# Patient Record
Sex: FEMALE | Race: BLACK OR AFRICAN AMERICAN | NOT HISPANIC OR LATINO | Employment: FULL TIME | URBAN - METROPOLITAN AREA
[De-identification: names, ages, dates, MRNs, and addresses within clinical notes are randomized per-mention and may not be internally consistent; named-entity substitution may affect disease eponyms.]

---

## 2023-10-06 PROBLEM — N94.6 DYSMENORRHEA: Status: ACTIVE | Noted: 2023-10-06

## 2023-10-06 PROBLEM — H53.8 BLURRED VISION: Status: ACTIVE | Noted: 2023-10-06

## 2023-10-06 PROBLEM — L70.0 ACNE VULGARIS: Status: ACTIVE | Noted: 2022-12-05

## 2023-10-06 PROBLEM — B37.31 CANDIDA VAGINITIS: Status: ACTIVE | Noted: 2023-10-06

## 2023-10-06 PROBLEM — E55.9 VITAMIN D DEFICIENCY: Status: ACTIVE | Noted: 2023-10-06

## 2023-10-06 PROBLEM — R07.9 CHEST PAIN: Status: ACTIVE | Noted: 2023-10-06

## 2023-10-06 PROBLEM — L01.00 IMPETIGO: Status: ACTIVE | Noted: 2023-10-06

## 2023-10-06 PROBLEM — B86 SCABIES: Status: ACTIVE | Noted: 2023-10-06

## 2023-10-06 PROBLEM — L50.9 URTICARIA: Status: ACTIVE | Noted: 2023-10-06

## 2023-10-06 PROBLEM — N89.8 VAGINAL DISCHARGE: Status: ACTIVE | Noted: 2023-10-06

## 2023-10-06 PROBLEM — N76.0 BACTERIAL VAGINOSIS: Status: ACTIVE | Noted: 2023-10-06

## 2023-10-06 PROBLEM — B96.89 BACTERIAL VAGINOSIS: Status: ACTIVE | Noted: 2023-10-06

## 2023-10-06 PROBLEM — R41.840 ATTENTION OR CONCENTRATION DEFICIT: Status: ACTIVE | Noted: 2023-10-06

## 2023-10-06 PROBLEM — A74.9 CHLAMYDIA: Status: ACTIVE | Noted: 2023-10-06

## 2023-10-06 PROBLEM — H66.90 RECURRENT OTITIS MEDIA: Status: ACTIVE | Noted: 2023-10-06

## 2023-10-06 PROBLEM — L57.9 SKIN CHANGES DUE TO CHRONIC EXPOSURE TO NONIONIZING RADIATION, UNSPECIFIED: Status: ACTIVE | Noted: 2022-12-05

## 2023-10-06 PROBLEM — E87.8 IMPAIRED HYDRATION: Status: ACTIVE | Noted: 2023-10-06

## 2023-10-06 RX ORDER — DESOGESTREL AND ETHINYL ESTRADIOL 0.15-0.03
1 KIT ORAL DAILY
COMMUNITY
Start: 2021-06-24

## 2023-10-06 RX ORDER — L. ACIDOPHILUS/L.BULGARICUS 1MM CELL
1 TABLET ORAL DAILY
COMMUNITY
Start: 2020-05-26

## 2023-10-06 RX ORDER — METRONIDAZOLE 500 MG/1
TABLET ORAL
COMMUNITY
Start: 2017-09-28

## 2023-10-06 RX ORDER — IBUPROFEN 200 MG
1 CAPSULE ORAL DAILY
COMMUNITY
Start: 2020-04-16

## 2023-10-06 RX ORDER — BENZOYL PEROXIDE 100 MG/ML
LIQUID TOPICAL
COMMUNITY
Start: 2019-09-17

## 2023-10-06 RX ORDER — CLINDAMYCIN PHOSPHATE 10 UG/ML
LOTION TOPICAL
COMMUNITY
Start: 2019-09-17

## 2024-12-09 ENCOUNTER — HOSPITAL ENCOUNTER (EMERGENCY)
Facility: HOSPITAL | Age: 24
Discharge: HOME | End: 2024-12-09
Payer: COMMERCIAL

## 2024-12-09 VITALS
HEART RATE: 96 BPM | RESPIRATION RATE: 18 BRPM | DIASTOLIC BLOOD PRESSURE: 99 MMHG | TEMPERATURE: 97.8 F | HEIGHT: 67 IN | WEIGHT: 160 LBS | OXYGEN SATURATION: 99 % | BODY MASS INDEX: 25.11 KG/M2 | SYSTOLIC BLOOD PRESSURE: 139 MMHG

## 2024-12-09 DIAGNOSIS — Z11.3 SCREEN FOR STD (SEXUALLY TRANSMITTED DISEASE): Primary | ICD-10-CM

## 2024-12-09 DIAGNOSIS — R03.0 ELEVATED BLOOD PRESSURE READING: ICD-10-CM

## 2024-12-09 DIAGNOSIS — Z53.21 ELOPED FROM EMERGENCY DEPARTMENT: ICD-10-CM

## 2024-12-09 DIAGNOSIS — R82.998 URINE WHITE BLOOD CELLS INCREASED: ICD-10-CM

## 2024-12-09 LAB
APPEARANCE UR: ABNORMAL
BACTERIA #/AREA URNS AUTO: ABNORMAL /HPF
BILIRUB UR STRIP.AUTO-MCNC: NEGATIVE MG/DL
CLUE CELLS SPEC QL WET PREP: NORMAL
COLOR UR: YELLOW
GLUCOSE UR STRIP.AUTO-MCNC: NORMAL MG/DL
HCG UR QL IA.RAPID: NEGATIVE
KETONES UR STRIP.AUTO-MCNC: ABNORMAL MG/DL
LEUKOCYTE ESTERASE UR QL STRIP.AUTO: ABNORMAL
MUCOUS THREADS #/AREA URNS AUTO: ABNORMAL /LPF
NITRITE UR QL STRIP.AUTO: NEGATIVE
PH UR STRIP.AUTO: 6 [PH]
PROT UR STRIP.AUTO-MCNC: ABNORMAL MG/DL
RBC # UR STRIP.AUTO: NEGATIVE /UL
RBC #/AREA URNS AUTO: ABNORMAL /HPF
SP GR UR STRIP.AUTO: 1.03
SQUAMOUS #/AREA URNS AUTO: ABNORMAL /HPF
T VAGINALIS SPEC QL WET PREP: NORMAL
UROBILINOGEN UR STRIP.AUTO-MCNC: ABNORMAL MG/DL
WBC #/AREA URNS AUTO: ABNORMAL /HPF
WBC VAG QL WET PREP: NORMAL
YEAST VAG QL WET PREP: NORMAL

## 2024-12-09 PROCEDURE — 81025 URINE PREGNANCY TEST: CPT

## 2024-12-09 PROCEDURE — 87591 N.GONORRHOEAE DNA AMP PROB: CPT | Mod: AHULAB

## 2024-12-09 PROCEDURE — 99283 EMERGENCY DEPT VISIT LOW MDM: CPT

## 2024-12-09 PROCEDURE — 81001 URINALYSIS AUTO W/SCOPE: CPT

## 2024-12-09 PROCEDURE — 87086 URINE CULTURE/COLONY COUNT: CPT | Mod: AHULAB

## 2024-12-09 PROCEDURE — 87210 SMEAR WET MOUNT SALINE/INK: CPT

## 2024-12-09 RX ORDER — CEPHALEXIN 500 MG/1
500 CAPSULE ORAL 2 TIMES DAILY
Qty: 10 CAPSULE | Refills: 0 | Status: SHIPPED | OUTPATIENT
Start: 2024-12-09 | End: 2024-12-14

## 2024-12-09 RX ORDER — CEPHALEXIN 500 MG/1
500 CAPSULE ORAL ONCE
Status: DISCONTINUED | OUTPATIENT
Start: 2024-12-09 | End: 2024-12-09 | Stop reason: HOSPADM

## 2024-12-09 RX ORDER — CEPHALEXIN 500 MG/1
500 CAPSULE ORAL 2 TIMES DAILY
Qty: 10 CAPSULE | Refills: 0 | Status: SHIPPED | OUTPATIENT
Start: 2024-12-09 | End: 2024-12-09

## 2024-12-09 ASSESSMENT — PAIN SCALES - GENERAL
PAINLEVEL_OUTOF10: 0 - NO PAIN
PAINLEVEL_OUTOF10: 0 - NO PAIN

## 2024-12-09 ASSESSMENT — COLUMBIA-SUICIDE SEVERITY RATING SCALE - C-SSRS
6. HAVE YOU EVER DONE ANYTHING, STARTED TO DO ANYTHING, OR PREPARED TO DO ANYTHING TO END YOUR LIFE?: NO
2. HAVE YOU ACTUALLY HAD ANY THOUGHTS OF KILLING YOURSELF?: NO
1. IN THE PAST MONTH, HAVE YOU WISHED YOU WERE DEAD OR WISHED YOU COULD GO TO SLEEP AND NOT WAKE UP?: NO

## 2024-12-09 ASSESSMENT — PAIN - FUNCTIONAL ASSESSMENT: PAIN_FUNCTIONAL_ASSESSMENT: 0-10

## 2024-12-09 NOTE — ED TRIAGE NOTES
Pt to ED from home requesting to have STD check.  Pt denies any symptoms, and denies her partner having any STDS or symptoms.  Pt nt answering when asked why she wants a check.

## 2024-12-10 LAB
C TRACH RRNA SPEC QL NAA+PROBE: NEGATIVE
N GONORRHOEA DNA SPEC QL PROBE+SIG AMP: NEGATIVE

## 2024-12-10 NOTE — ED PROVIDER NOTES
HPI   Chief Complaint   Patient presents with    Exposure to STD       24-year-old female presents to the ED today with a chief concern of exposure to STD.  Patient reports that she has no symptoms with just once get checked.  She reports that her boyfriend told her that he was going to get tested.  Patient denies any symptoms.  She denies any vaginal discharge.  She denies any fever/chills, nausea/vomiting.  Denies urinary symptoms.  Has had an STD in the past does not like she has 1 now.  Has no other symptoms or concerns at this time.      History provided by:  Patient   used: No            Patient History   Past Medical History:   Diagnosis Date    Other specified health status     No pertinent past surgical history    Other specified health status     No pertinent past medical history    Personal history of other (healed) physical injury and trauma 09/28/2017    History of insect bite    Personal history of other diseases of the nervous system and sense organs 12/19/2014    History of impacted cerumen    Personal history of other infectious and parasitic diseases 07/29/2014    History of tinea corporis     No past surgical history on file.  No family history on file.  Social History     Tobacco Use    Smoking status: Not on file    Smokeless tobacco: Not on file   Substance Use Topics    Alcohol use: Not on file    Drug use: Not on file       Physical Exam   ED Triage Vitals [12/09/24 1810]   Temperature Heart Rate Respirations BP   36.6 °C (97.8 °F) 96 18 (!) 139/99      Pulse Ox Temp Source Heart Rate Source Patient Position   99 % Temporal Monitor Sitting      BP Location FiO2 (%)     Right arm --       Physical Exam  Constitutional: Vital signs per nursing notes.  Well developed, well nourished.  No acute distress.    Eyes:  conjunctivae and lids normal  ENT: Ears normal externally; face symmetric. voice normal  Neck: neck supple, no meningismus; trachea midline without  deviation  Respiratory: normal respiratory effort and excursion; no rales, rhonchi, or wheezes; equal air entry  Cardiovascular: RRR, 2+ pulses extremities   Neurological: normal speech; CN II-XII grossly intact, normal motor and sensory function  GI: no distention, soft, nontender  : Deferred.  Denies  exam.  Prefers to self swab.  Musculoskeletal: normal gait and station; normal digits and nails; normal to palpation; normal strength/tone; neurovascular status intact.  Skin: normal to inspection; normal to palpation; no rash      ED Course & MDM   ED Course as of 12/11/24 1317   Mon Dec 09, 2024   1933 Urine is contaminated. [MC]   1933 Urine hCG negative [MC]   2040 Wet prep negative  [MC]      ED Course User Index  [MC] Cosme Culver PA-C         Diagnoses as of 12/11/24 1317   Screen for STD (sexually transmitted disease)   Elevated blood pressure reading   Urine white blood cells increased   Eloped from emergency department                 No data recorded     [place] Coma Scale Score: 15 (12/09/24 1809 : James Beckford RN)                           Medical Decision Making  24-year-old female presents to the ED today with a chief concern of exposure to STD.  Vital signs reassuring.  Patient overall appears well and is nontoxic-appearing. Patient has full range of motion of the neck without any meningismus.  Satting well on room air.  Not hypoxic.  Not tachycardic.  Afebrile.  Urinalysis shows contamination.  No nitrite but there is positive leukocyte esterase.  Urine hCG negative.  Wet prep normal.  Patient was also tested for gonorrhea and chlamydia.  She was not treated in advance for these.  Discussed with patient that she will be called by post discharge nurse if these results are positive.  Patient left without treatment being complete.  Called the patient and she told that she did not know that she was actually supposed to stay for the results.  She is refusing to come back.  She states that she will  just follow her results on MyChart.  Unable to provide full history, physical exam, and MDM due to the fact that patient left the treatment pain complete.  Did send Keflex for the white blood cells in the urine.  Will send urine for culture.    Differential diagnosis: STD, BV, candidiasis, uti    Disposition/treatment  1.  See diagnosis    Patient left without treatment being complete        Procedure  Procedures     Cosme Culver PA-C  12/11/24 [id number]

## 2024-12-11 LAB — BACTERIA UR CULT: NORMAL

## 2025-01-04 ENCOUNTER — OFFICE VISIT (OUTPATIENT)
Dept: URGENT CARE | Age: 25
End: 2025-01-04
Payer: COMMERCIAL

## 2025-01-04 VITALS
DIASTOLIC BLOOD PRESSURE: 84 MMHG | OXYGEN SATURATION: 98 % | TEMPERATURE: 98.9 F | HEART RATE: 103 BPM | SYSTOLIC BLOOD PRESSURE: 125 MMHG

## 2025-01-04 DIAGNOSIS — J02.9 PHARYNGITIS, UNSPECIFIED ETIOLOGY: ICD-10-CM

## 2025-01-04 DIAGNOSIS — J02.0 STREP PHARYNGITIS: Primary | ICD-10-CM

## 2025-01-04 LAB — POC RAPID STREP: POSITIVE

## 2025-01-04 RX ORDER — GLYCERIN 0.25 %
1 DROPS OPHTHALMIC (EYE) 4 TIMES DAILY PRN
Qty: 60 ML | Refills: 0 | Status: SHIPPED | OUTPATIENT
Start: 2025-01-04 | End: 2025-02-03

## 2025-01-04 RX ORDER — AMOXICILLIN 875 MG/1
875 TABLET, FILM COATED ORAL 2 TIMES DAILY
Qty: 20 TABLET | Refills: 0 | Status: SHIPPED | OUTPATIENT
Start: 2025-01-04 | End: 2025-01-14

## 2025-01-04 ASSESSMENT — ENCOUNTER SYMPTOMS
NAUSEA: 0
SORE THROAT: 1
RESPIRATORY NEGATIVE: 1
CONSTITUTIONAL NEGATIVE: 1
VOMITING: 0

## 2025-01-04 NOTE — PROGRESS NOTES
[facility]   Patient ID: Anahi Meek is a 24 y.o. female. They present today with a chief complaint of swollen tonsils (Pain and swollen sore throat, hard to swallow xyesterday/HX Tonsilitis).    History of Present Illness  Sore throat for a couple of days.  Denies any other symptoms.  Pain is extremely severe      History provided by:  Patient   used: No        Past Medical History  Allergies as of 01/04/2025    (No Known Allergies)       (Not in a hospital admission)       Past Medical History:   Diagnosis Date    Other specified health status     No pertinent past surgical history    Other specified health status     No pertinent past medical history    Personal history of other (healed) physical injury and trauma 09/28/2017    History of insect bite    Personal history of other diseases of the nervous system and sense organs 12/19/2014    History of impacted cerumen    Personal history of other infectious and parasitic diseases 07/29/2014    History of tinea corporis       No past surgical history on file.         Review of Systems  Review of Systems   Constitutional: Negative.    HENT:  Positive for sore throat.         Complains of a sore throat which is worse when she swallows   Respiratory: Negative.     Gastrointestinal:  Negative for nausea and vomiting.                                  Objective    Vitals:    01/04/25 1707   BP: 125/84   BP Location: Left arm   Patient Position: Sitting   BP Cuff Size: Small adult   Pulse: 103   Temp: 37.2 °C (98.9 °F)   TempSrc: Oral   SpO2: 98%     No LMP recorded.    Physical Exam  Vitals and nursing note reviewed.   Constitutional:       Appearance: Normal appearance.      Comments: Pleasant cooperative very well coiffed well-dressed fully made up 24-year-old female here with her significant other.   HENT:      Head: Normocephalic and atraumatic.      Right Ear: Ear canal and external ear normal.      Left Ear: Ear canal and external ear  normal.      Nose: Nose normal.      Mouth/Throat:      Mouth: Mucous membranes are moist.      Comments: Tonsils 1+ bilaterally.  No exudates.  Uvula midline.  Throat is beefy red with petechiae over the soft palate  Eyes:      Extraocular Movements: Extraocular movements intact.      Pupils: Pupils are equal, round, and reactive to light.   Cardiovascular:      Rate and Rhythm: Normal rate.   Pulmonary:      Effort: Pulmonary effort is normal. No respiratory distress.   Neurological:      General: No focal deficit present.      Mental Status: She is alert and oriented to person, place, and time.   Psychiatric:         Mood and Affect: Mood normal.         Behavior: Behavior normal.         Procedures    Point of Care Test & Imaging Results from this visit  No results found for this visit on 01/04/25.   No results found.    Diagnostic study results (if any) were reviewed by [facility].    Assessment/Plan   Allergies, medications, history, and pertinent labs/EKGs/Imaging reviewed by Christy Aiken PA-C.     Medical Decision Making  History and physical examination as well as testing are all consistent with strep pharyngitis.  Will treat her with amoxicillin.  Will give her some Solu-Medrol IM here to help with the swelling and inflammation which will reduce her throat pain.  Will also prescribe Chloraseptic throat spray.  If her symptoms worsen especially if she develops difficulty swallowing changes in her voice or drooling she will go to the ER for follow-up    Orders and Diagnoses  There are no diagnoses linked to this encounter.    Medical Admin Record      Patient disposition: Home    Electronically signed by [facility]  5:56 PM

## 2025-06-03 ENCOUNTER — APPOINTMENT (OUTPATIENT)
Dept: DERMATOLOGY | Facility: CLINIC | Age: 25
End: 2025-06-03
Payer: COMMERCIAL

## 2025-06-03 DIAGNOSIS — L70.0 ACNE VULGARIS: Primary | ICD-10-CM

## 2025-06-03 PROCEDURE — 99203 OFFICE O/P NEW LOW 30 MIN: CPT

## 2025-06-03 RX ORDER — CLINDAMYCIN PHOSPHATE 10 UG/ML
LOTION TOPICAL DAILY
Qty: 60 ML | Refills: 3 | Status: SHIPPED | OUTPATIENT
Start: 2025-06-03 | End: 2026-06-03

## 2025-06-03 RX ORDER — TRETINOIN 0.25 MG/G
CREAM TOPICAL
Qty: 45 G | Refills: 3 | Status: SHIPPED | OUTPATIENT
Start: 2025-06-03

## 2025-06-03 RX ORDER — BENZOYL PEROXIDE 50 MG/ML
1 LIQUID TOPICAL DAILY
Qty: 236 ML | Refills: 3 | Status: SHIPPED | OUTPATIENT
Start: 2025-06-03 | End: 2026-06-03

## 2025-06-03 ASSESSMENT — DERMATOLOGY QUALITY OF LIFE (QOL) ASSESSMENT
RATE HOW EMOTIONALLY BOTHERED YOU ARE BY YOUR SKIN PROBLEM (FOR EXAMPLE, WORRY, EMBARRASSMENT, FRUSTRATION): 6 - ALWAYS BOTHERED
RATE HOW BOTHERED YOU ARE BY SYMPTOMS OF YOUR SKIN PROBLEM (EG, ITCHING, STINGING BURNING, HURTING OR SKIN IRRITATION): 3
RATE HOW EMOTIONALLY BOTHERED YOU ARE BY YOUR SKIN PROBLEM (FOR EXAMPLE, WORRY, EMBARRASSMENT, FRUSTRATION): 6 - ALWAYS BOTHERED
RATE HOW BOTHERED YOU ARE BY EFFECTS OF YOUR SKIN PROBLEMS ON YOUR ACTIVITIES (EG, GOING OUT, ACCOMPLISHING WHAT YOU WANT, WORK ACTIVITIES OR YOUR RELATIONSHIPS WITH OTHERS): 0 - NEVER BOTHERED
WHAT SINGLE SKIN CONDITION LISTED BELOW IS THE PATIENT ANSWERING THE QUALITY-OF-LIFE ASSESSMENT QUESTIONS ABOUT: ACNE
WHAT SINGLE SKIN CONDITION LISTED BELOW IS THE PATIENT ANSWERING THE QUALITY-OF-LIFE ASSESSMENT QUESTIONS ABOUT: ACNE
RATE HOW BOTHERED YOU ARE BY EFFECTS OF YOUR SKIN PROBLEMS ON YOUR ACTIVITIES (EG, GOING OUT, ACCOMPLISHING WHAT YOU WANT, WORK ACTIVITIES OR YOUR RELATIONSHIPS WITH OTHERS): 0 - NEVER BOTHERED
RATE HOW BOTHERED YOU ARE BY SYMPTOMS OF YOUR SKIN PROBLEM (EG, ITCHING, STINGING BURNING, HURTING OR SKIN IRRITATION): 3

## 2025-06-03 ASSESSMENT — PATIENT GLOBAL ASSESSMENT (PGA): WHAT IS THE PGA: PATIENT GLOBAL ASSESSMENT:  2 - MILD

## 2025-06-03 NOTE — Clinical Note
-Discussed diagnosis of acne  -Discussed expectations for treatment  -Recommend:    -Start Benzoyl Peroxide 5% wash once daily in the morning.  -Start Clindamycin 1% lotion daily in the morning.  -Start Tretinoin 0.025% cream at bedtime. Apply a thin layer to the face 2 nights a week and increase as tolerated.     The risks, benefits, and side effects of these medications, including the redness, dryness, and irritation expected with Tretinoin use, were discussed.    -I informed the patient it will likely take at least 2-3 months to notice any significant improvement with the treatment regimen outlined above.  -The patient should discontinue use of these medications should she become or attempt to become pregnant at any time in the future.      -Discussed using a noncomedogenic moisturizer to help with dryness.  -If using a cleanser in the evening, I recommend a gentle cleanser such as Cetaphil or Cerave.    -Follow up in 3 months.

## 2025-06-03 NOTE — PROGRESS NOTES
Subjective     Anahi Meek is a 25 y.o. female who presents for the following: Acne (Acne break outs on face with some hyperpigmantation --break outs come and go --worse in the past year after she had her daughter --puctules and comedones ---not using any meds right now -). She was last seen on 4/18/2022 by Dr. Cortes and prescribed Benzoyl Peroxide 5% wash, Clindamycin 1% lotion, and Tretinoin 0.025% cream. She reports a good response to the treatment regimen.       Review of Systems:  No other skin or systemic complaints other than what is documented elsewhere in the note.    The following portions of the chart were reviewed this encounter and updated as appropriate:       Skin Cancer History  Biopsy Log Book  No skin cancers from Specimen Tracking.    Additional History      Specialty Problems          Dermatology Problems    Acne vulgaris    Skin changes due to chronic exposure to nonionizing radiation, unspecified    Scabies    Urticaria     Past Medical History:  Anahi Meek  has a past medical history of Other specified health status, Other specified health status, Personal history of other (healed) physical injury and trauma (09/28/2017), Personal history of other diseases of the nervous system and sense organs (12/19/2014), and Personal history of other infectious and parasitic diseases (07/29/2014).    Past Surgical History:  Anahi Meek  has no past surgical history on file.    Family History:  Patient family history is not on file.       Objective   Well appearing patient in no apparent distress; mood and affect are within normal limits.    A focused examination was performed.  All findings within normal limits unless otherwise noted below.    Assessment/Plan   Skin Exam  1. ACNE VULGARIS  Head - Anterior (Face)  Scattered comedones. One resolving inflammatory papule on the left cheek.   -Discussed diagnosis of acne  -Discussed expectations for treatment  -Recommend:    -Start  Benzoyl Peroxide 5% wash once daily in the morning.  -Start Clindamycin 1% lotion daily in the morning.  -Start Tretinoin 0.025% cream at bedtime. Apply a thin layer to the face 2 nights a week and increase as tolerated.     The risks, benefits, and side effects of these medications, including the redness, dryness, and irritation expected with Tretinoin use, were discussed.    -I informed the patient it will likely take at least 2-3 months to notice any significant improvement with the treatment regimen outlined above.  -The patient should discontinue use of these medications should she become or attempt to become pregnant at any time in the future.      -Discussed using a noncomedogenic moisturizer to help with dryness.  -If using a cleanser in the evening, I recommend a gentle cleanser such as Cetaphil or Cerave.    -Follow up in 3 months.   benzoyl peroxide 5 % external wash - Head - Anterior (Face)  Apply 1 Application topically once daily. Wash face daily in the morning.    clindamycin (Cleocin T) 1 % lotion - Head - Anterior (Face)  Apply topically once daily. Apply to face daily in the morning.    tretinoin (Retin-A) 0.025 % cream - Head - Anterior (Face)  Apply a thin layer to clean dry face at bedtime.  Start off 2x/week and increase as tolerated.

## 2025-06-11 ENCOUNTER — APPOINTMENT (OUTPATIENT)
Dept: PRIMARY CARE | Facility: CLINIC | Age: 25
End: 2025-06-11
Payer: COMMERCIAL

## 2025-08-15 DIAGNOSIS — L70.0 ACNE VULGARIS: ICD-10-CM

## 2025-08-15 RX ORDER — BENZOYL PEROXIDE 50 MG/ML
1 LIQUID TOPICAL DAILY
Qty: 236 ML | Refills: 3 | Status: SHIPPED | OUTPATIENT
Start: 2025-08-15 | End: 2026-08-15

## 2025-08-16 DIAGNOSIS — L70.0 ACNE VULGARIS: ICD-10-CM

## 2025-08-21 DIAGNOSIS — L70.0 ACNE VULGARIS: ICD-10-CM

## 2025-08-21 RX ORDER — BENZOYL PEROXIDE 50 MG/ML
1 LIQUID TOPICAL DAILY
Qty: 236 ML | Refills: 3 | Status: SHIPPED | OUTPATIENT
Start: 2025-08-21 | End: 2026-08-21

## 2025-08-25 DIAGNOSIS — L70.0 ACNE VULGARIS: ICD-10-CM

## 2025-08-25 RX ORDER — BENZOYL PEROXIDE 50 MG/ML
1 LIQUID TOPICAL DAILY
Qty: 236 ML | Refills: 3 | Status: SHIPPED | OUTPATIENT
Start: 2025-08-25 | End: 2025-08-27

## 2025-08-27 ENCOUNTER — OFFICE VISIT (OUTPATIENT)
Dept: URGENT CARE | Age: 25
End: 2025-08-27
Payer: COMMERCIAL

## 2025-08-27 VITALS
OXYGEN SATURATION: 98 % | RESPIRATION RATE: 18 BRPM | SYSTOLIC BLOOD PRESSURE: 124 MMHG | HEART RATE: 96 BPM | DIASTOLIC BLOOD PRESSURE: 89 MMHG

## 2025-08-27 DIAGNOSIS — Z20.7 EXPOSURE TO SCABIES: Primary | ICD-10-CM

## 2025-08-27 PROCEDURE — 1036F TOBACCO NON-USER: CPT

## 2025-08-27 PROCEDURE — 99213 OFFICE O/P EST LOW 20 MIN: CPT

## 2025-08-27 RX ORDER — BENZOYL PEROXIDE 50 MG/ML
1 LIQUID TOPICAL DAILY
Qty: 236 ML | Refills: 3 | Status: SHIPPED | OUTPATIENT
Start: 2025-08-27 | End: 2026-08-27

## 2025-08-27 RX ORDER — PERMETHRIN 50 MG/G
CREAM TOPICAL ONCE
Qty: 60 G | Refills: 1 | Status: SHIPPED | OUTPATIENT
Start: 2025-08-27 | End: 2025-08-27

## 2025-08-27 ASSESSMENT — PAIN SCALES - GENERAL: PAINLEVEL_OUTOF10: 0-NO PAIN
